# Patient Record
Sex: MALE | Race: WHITE
[De-identification: names, ages, dates, MRNs, and addresses within clinical notes are randomized per-mention and may not be internally consistent; named-entity substitution may affect disease eponyms.]

---

## 2021-01-25 ENCOUNTER — HOSPITAL ENCOUNTER (OUTPATIENT)
Age: 86
End: 2021-01-25
Payer: MEDICARE

## 2021-01-25 DIAGNOSIS — C82.80: Primary | ICD-10-CM

## 2021-01-25 DIAGNOSIS — C61: ICD-10-CM

## 2021-01-25 DIAGNOSIS — Z96.653: ICD-10-CM

## 2021-01-25 PROCEDURE — 78306 BONE IMAGING WHOLE BODY: CPT

## 2021-01-25 NOTE — DI.NM.S_ITS
PROCEDURE:  NM BONE SCAN WHOLE BODY  
   
RADIOPHARMACEUTICAL:  20.2 mCi Tc-99m MDP IV.    
   
INDICATIONS:  prostate cancer, follicular lymphoma  
   
TECHNIQUE:    
Delayed whole-body scintigrams were obtained approximately 3-4 hours after intravenous   
injection of radiotracer.  Anterior and posterior views were acquired from vertex to   
feet.  Additional left and right oblique views of the pelvis were obtained.    
   
COMPARISON:  [place], [place], NM WHITE BLOOD CELL LIMITED, 1/08/2021, 10:07.    
[place], [place] BONE SCAN 3 PHASE, 1/07/2021, 8:03.  
   
FINDINGS:  No lesions are identified in skull, sternum, clavicles, scapulae, ribs, bony   
pelvis, and visualized shafts of the long bones. There is low level increased uptake in   
cervical, thoracic and lumbar spine with distribution indistinguishable from degenerative   
disc and facet disease; early metastasis to spine could be obscured by degenerative   
changes. There are foci of increased periarticular activity involving shoulders,   
sternoclavicular joints, elbows, wrists, right hand, hips, ankles and feet, compatible   
with degenerative/arthritic changes.  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    
There is increased uptake in the right medial tibial plateau at the interface of bone and   
prosthesis, suspicious for prosthesis loosening or infection.  
   
IMPRESSION:    
   
1. Foci of increased uptake in spine may be degenerative in nature.  Recommend   
radiographic correlation.  
   
2. There is intense uptake in the medial aspect of the right knee at the prosthesis-bone   
interface, suspicious for prosthesis loosening or infection.  Recommend clinical and   
radiographic correlation.  
   
   
Dictated by: Tuan Vargas M.D. on 1/25/2021 at 16:26       
Approved by: Tuan Vargas M.D. on 1/25/2021 at 17:52

## 2022-06-23 ENCOUNTER — HOSPITAL ENCOUNTER (OUTPATIENT)
Age: 87
End: 2022-06-23
Payer: MEDICARE

## 2022-06-23 DIAGNOSIS — M79.89: ICD-10-CM

## 2022-06-23 DIAGNOSIS — R22.31: ICD-10-CM

## 2022-06-23 DIAGNOSIS — C82.80: ICD-10-CM

## 2022-06-23 DIAGNOSIS — C61: Primary | ICD-10-CM

## 2022-06-23 PROCEDURE — 93971 EXTREMITY STUDY: CPT

## 2024-01-26 ENCOUNTER — OFFICE VISIT (OUTPATIENT)
Dept: URBAN - METROPOLITAN AREA CLINIC 51 | Facility: CLINIC | Age: 89
End: 2024-01-26
Payer: MEDICARE

## 2024-01-26 DIAGNOSIS — B02.33 HERPES ZOSTER KERATOCONJUNCTIVITIS: ICD-10-CM

## 2024-01-26 PROCEDURE — 99204 OFFICE O/P NEW MOD 45 MIN: CPT | Performed by: OPTOMETRIST

## 2024-01-26 RX ORDER — GANCICLOVIR 1.5 MG/G
0.15 % GEL OPHTHALMIC
Qty: 5 | Refills: 0 | Status: ACTIVE
Start: 2024-01-26

## 2024-01-26 ASSESSMENT — INTRAOCULAR PRESSURE
OD: 20
OS: 28
OD: 24
OS: 24

## 2024-01-29 ENCOUNTER — OFFICE VISIT (OUTPATIENT)
Dept: URBAN - METROPOLITAN AREA CLINIC 51 | Facility: CLINIC | Age: 89
End: 2024-01-29
Payer: MEDICARE

## 2024-01-29 DIAGNOSIS — B02.39 OTHER HERPES ZOSTER EYE DISEASE: Primary | ICD-10-CM

## 2024-01-29 PROCEDURE — 99213 OFFICE O/P EST LOW 20 MIN: CPT | Performed by: OPTOMETRIST

## 2024-01-29 ASSESSMENT — INTRAOCULAR PRESSURE
OD: 17
OS: 15

## 2024-03-04 ENCOUNTER — OFFICE VISIT (OUTPATIENT)
Dept: URBAN - METROPOLITAN AREA CLINIC 51 | Facility: CLINIC | Age: 89
End: 2024-03-04
Payer: MEDICARE

## 2024-03-04 DIAGNOSIS — B02.39 OTHER HERPES ZOSTER EYE DISEASE: Primary | ICD-10-CM

## 2024-03-04 PROCEDURE — 99213 OFFICE O/P EST LOW 20 MIN: CPT | Performed by: OPTOMETRIST

## 2024-03-04 ASSESSMENT — INTRAOCULAR PRESSURE
OS: 25
OD: 14